# Patient Record
Sex: MALE | Race: WHITE | Employment: FULL TIME | URBAN - NONMETROPOLITAN AREA
[De-identification: names, ages, dates, MRNs, and addresses within clinical notes are randomized per-mention and may not be internally consistent; named-entity substitution may affect disease eponyms.]

---

## 2019-04-02 ENCOUNTER — HOSPITAL ENCOUNTER (EMERGENCY)
Age: 34
Discharge: HOME | End: 2019-04-02
Attending: EMERGENCY MEDICINE
Payer: COMMERCIAL

## 2019-04-02 ENCOUNTER — APPOINTMENT (OUTPATIENT)
Dept: GENERAL RADIOLOGY | Age: 34
End: 2019-04-02
Payer: COMMERCIAL

## 2019-04-02 DIAGNOSIS — S61.412A LACERATION OF SKIN OF LEFT HAND, INITIAL ENCOUNTER: Primary | ICD-10-CM

## 2019-04-02 DIAGNOSIS — S66.902A INJURY OF EXTENSOR TENDON OF HAND, LEFT, INITIAL ENCOUNTER: ICD-10-CM

## 2019-04-02 PROCEDURE — 6360000002 HC RX W HCPCS: Performed by: EMERGENCY MEDICINE

## 2019-04-02 PROCEDURE — 12001 RPR S/N/AX/GEN/TRNK 2.5CM/<: CPT

## 2019-04-02 PROCEDURE — 2500000003 HC RX 250 WO HCPCS: Performed by: EMERGENCY MEDICINE

## 2019-04-02 PROCEDURE — 99283 EMERGENCY DEPT VISIT LOW MDM: CPT

## 2019-04-02 PROCEDURE — 73130 X-RAY EXAM OF HAND: CPT

## 2019-04-02 PROCEDURE — 90471 IMMUNIZATION ADMIN: CPT | Performed by: EMERGENCY MEDICINE

## 2019-04-02 PROCEDURE — 2709999900 HC NON-CHARGEABLE SUPPLY

## 2019-04-02 PROCEDURE — 6370000000 HC RX 637 (ALT 250 FOR IP): Performed by: EMERGENCY MEDICINE

## 2019-04-02 PROCEDURE — L3923 HFO WITHOUT JOINTS PRE CST: HCPCS

## 2019-04-02 PROCEDURE — 90715 TDAP VACCINE 7 YRS/> IM: CPT | Performed by: EMERGENCY MEDICINE

## 2019-04-02 RX ORDER — CEPHALEXIN 500 MG/1
500 CAPSULE ORAL EVERY 6 HOURS SCHEDULED
Status: DISCONTINUED | OUTPATIENT
Start: 2019-04-02 | End: 2019-04-02 | Stop reason: HOSPADM

## 2019-04-02 RX ORDER — LIDOCAINE HYDROCHLORIDE 10 MG/ML
10 INJECTION, SOLUTION EPIDURAL; INFILTRATION; INTRACAUDAL; PERINEURAL ONCE
Status: COMPLETED | OUTPATIENT
Start: 2019-04-02 | End: 2019-04-02

## 2019-04-02 RX ORDER — CEPHALEXIN 500 MG/1
500 CAPSULE ORAL 4 TIMES DAILY
Qty: 28 CAPSULE | Refills: 0 | Status: SHIPPED | OUTPATIENT
Start: 2019-04-02 | End: 2019-04-09

## 2019-04-02 RX ADMIN — TETANUS TOXOID, REDUCED DIPHTHERIA TOXOID AND ACELLULAR PERTUSSIS VACCINE, ADSORBED 0.5 ML: 5; 2.5; 8; 8; 2.5 SUSPENSION INTRAMUSCULAR at 19:03

## 2019-04-02 RX ADMIN — LIDOCAINE HYDROCHLORIDE 10 ML: 10 INJECTION, SOLUTION EPIDURAL; INFILTRATION; INTRACAUDAL; PERINEURAL at 19:04

## 2019-04-02 RX ADMIN — CEPHALEXIN 500 MG: 500 CAPSULE ORAL at 20:43

## 2019-04-02 NOTE — ED TRIAGE NOTES
Pt was at work and got base of left thumb caught in a . Laceration with minimal bleeding, denies pain.

## 2019-04-02 NOTE — ED PROVIDER NOTES
[facility]  [address] [address]  Phone: [phone]    eMERGENCY dEPARTMENT eNCOUnter           [address]       Chief Complaint   Patient presents with    Laceration     2.3x0.3cm laceration at base of left thumb. Pt got it caught in the . Nurses Notes reviewed and I agree except as noted in the HPI. HISTORY OF PRESENT ILLNESS    Rivas Yancey is a 29 y.o. male who presented via private vehicle with the chief complaint mentioned above. He said that he had his left hand caught in a  at work prior to arrival.  He sustained a laceration to his left hand. He  denies focal weakness or numbness. He is complaining of mild sharp pain involving left hand thumb, which is worse with movement. REVIEW OF SYSTEMS     . He denies other injuries. PAST MEDICAL HISTORY    has no past medical history on file. SURGICAL HISTORY      has no past surgical history on file. CURRENT MEDICATIONS       Previous Medications    PSEUDOEPHEDRINE-APAP-DM (DAYQUIL PO)    Take by mouth as needed       ALLERGIES     has No Known Allergies. FAMILY HISTORY     indicated that the status of his maternal grandmother is unknown. He indicated that the status of his maternal aunt is unknown.   family history includes High Blood Pressure in his maternal aunt and maternal grandmother. SOCIAL HISTORY      reports that he has never smoked. His smokeless tobacco use includes chew. PHYSICAL EXAM     INITIAL VITALS:  height is 6' (1.829 m) and weight is 260 lb (117.9 kg). His temporal temperature is 98.4 °F (36.9 °C). His blood pressure is 139/96 (abnormal) and his pulse is 105. His respiration is 16 and oxygen saturation is 97%. Physical Exam   Constitutional: He appears well-developed and well-nourished. No distress. HENT:   Head: Atraumatic.    Musculoskeletal:        Hands:  Examination of the left hand showed 2.3 cm laceration involving the dorsal aspect of the PIP joint of the thumb, patient has strong flexion, extension with 2 phalanxes over the left thumb. He has normal sensory examination as well. Neurological: He is alert. DIAGNOSTIC RESULTS       RADIOLOGY:   Left Hand x-ray was unremarkable. LABS:   Labs Reviewed - No data to display    EMERGENCY DEPARTMENT COURSE:   Vitals:    Vitals:    04/02/19 1835   BP: (!) 139/96   Pulse: 105   Resp: 16   Temp: 98.4 °F (36.9 °C)   TempSrc: Temporal   SpO2: 97%   Weight: 260 lb (117.9 kg)   Height: 6' (1.829 m)   Patient  received Keflex 500 mg by mouth. .  He also received left thumb spica. PROCEDU spike. . RES:  Left hand laceration repair :  5 ML of 1% lidocaine without epinephrine was used for topical anesthesia, I explored the wound, there is no foreign body. I debrided the devitalized edges of the wound using scissors. The  extensor tendon is slightly and partially  injured, but its  function is intact. I irrigated profusely with saline and Betadine. I repaired the wound with 5 stitches of 4. 0 nylon. FINAL IMPRESSION      1. Laceration of skin of left hand, initial encounter    2. Injury of extensor tendon of hand, left, initial encounter          DISPOSITION/PLAN   She was discharged home in stable condition, he was given discharge instructions, and was referred to hand surgery and was advised to return if worse or new symptoms.     PATIENT REFERRED TO:  Maribel Rosenthal MD  [address]  [phone]    In 1 day        DISCHARGE MEDICATIONS:  New Prescriptions    CEPHALEXIN (KEFLEX) 500 MG CAPSULE    Take 1 capsule by mouth 4 times daily for 7 days       (Please note that portions of this note were completed with a voice recognition program.  Efforts were made to edit the dictations but occasionally words are mis-transcribed.)    MD Casey Raya MD  04/02/19 2016

## 2019-04-02 NOTE — ED NOTES
5 sutures placed by Dr. Israel Jacobo. Wound edges well approximated. telfa and cling wrap applied. Volar splint placed per verbal order of Dr. Israel Jacobo.      Figueroa Mccann RN  04/02/19 1947

## 2019-04-03 VITALS
WEIGHT: 260 LBS | HEART RATE: 72 BPM | OXYGEN SATURATION: 97 % | RESPIRATION RATE: 18 BRPM | TEMPERATURE: 98.4 F | HEIGHT: 72 IN | DIASTOLIC BLOOD PRESSURE: 80 MMHG | BODY MASS INDEX: 35.21 KG/M2 | SYSTOLIC BLOOD PRESSURE: 125 MMHG

## 2019-04-03 NOTE — ED NOTES
Discharged home in stable condition. Printed prescription for Keflex given with dosing instructions and possible side effects. AVS discusses/reviewed with patient. Patient verbalized understanding of all instructions given; no questions/concerns voiced. Respirations easy, regular and non-labored; no distress noted. Skin pink, warm and dry; mucous membranes moist. Alert and appropriate for age. Ambulated with his wife to private vehicle.      Gallo Gtz RN  04/03/19 6260

## 2019-04-03 NOTE — ED NOTES
Volar splint removed per Dr. Belinda Clancy. States he wants a velcro thumb spika splint. Placed on patient.        Zac Dewey RN  04/02/19 2031

## 2024-03-27 ENCOUNTER — HOSPITAL ENCOUNTER (OUTPATIENT)
Age: 39
Discharge: HOME | End: 2024-03-27

## 2024-03-27 ENCOUNTER — HOSPITAL ENCOUNTER (OUTPATIENT)
Dept: GENERAL RADIOLOGY | Age: 39
Discharge: HOME | End: 2024-03-27

## 2024-03-27 DIAGNOSIS — Z02.1 PHYSICAL EXAM, PRE-EMPLOYMENT: ICD-10-CM
